# Patient Record
Sex: FEMALE | Race: BLACK OR AFRICAN AMERICAN | Employment: PART TIME | URBAN - METROPOLITAN AREA
[De-identification: names, ages, dates, MRNs, and addresses within clinical notes are randomized per-mention and may not be internally consistent; named-entity substitution may affect disease eponyms.]

---

## 2017-03-27 ENCOUNTER — TELEPHONE (OUTPATIENT)
Dept: OBGYN | Facility: CLINIC | Age: 27
End: 2017-03-27

## 2017-03-27 DIAGNOSIS — Z32.01 POSITIVE PREGNANCY TEST: Primary | ICD-10-CM

## 2017-03-27 NOTE — TELEPHONE ENCOUNTER
Please advise     -congratulations!     -is good that notified early of pregnancy     -recommend quantitative HCG today and in 24 hours     -ultrasound when appropriate     -report any bleeding or lateralizing abdominal pain

## 2017-03-27 NOTE — TELEPHONE ENCOUNTER
Pt calls today reporting that she took a home pregnancy test that was positive on Saturday (two days ago).  She has hx of ectopic pregnancy in 2014 and wonders if she should have an appt early due to this.  LMP 02/22/17.   Do you want early u/s?    Pt advised to report any signs of abd pain or bleeding.       Leonela Keating R.N.

## 2017-03-28 ENCOUNTER — TELEPHONE (OUTPATIENT)
Dept: NURSING | Facility: CLINIC | Age: 27
End: 2017-03-28

## 2017-03-28 DIAGNOSIS — Z32.01 POSITIVE PREGNANCY TEST: ICD-10-CM

## 2017-03-28 LAB — B-HCG SERPL-ACNC: <1 IU/L (ref 0–5)

## 2017-03-28 PROCEDURE — 84702 CHORIONIC GONADOTROPIN TEST: CPT | Performed by: OBSTETRICS & GYNECOLOGY

## 2017-03-28 PROCEDURE — 36415 COLL VENOUS BLD VENIPUNCTURE: CPT | Performed by: OBSTETRICS & GYNECOLOGY

## 2017-03-28 NOTE — TELEPHONE ENCOUNTER
"Call Type: Triage Call    Presenting Problem: \"Calling for lab results.\"  Triage Note:  Guideline Title: Information Only Call; No Symptom Triage (Adult)  Recommended Disposition: Call Provider When Office is Open  Original Inclination: Wanted to speak with a nurse  Override Disposition:  Intended Action: Follow advice given  Physician Contacted: No  Requesting information not available per approved reference or clinical  experience; no triage required. ?  YES  Requesting regular office appointment ? NO  Sign(s) or symptom(s) associated with a diagnosed condition or with a new illness  ? NO  Requesting information about provider, services or community resources ? NO  Call back to complete assessment/clarification of information from prior caller to  complete triage ? NO  Requesting information and provider is best resource; no triage required. ? NO  Requesting provider information for recently scheduled test, procedure; no triage  required. Needed information not available per approved resources or clinical  experience. ? NO  Physician Instructions:  Care Advice:  "

## 2017-03-29 NOTE — TELEPHONE ENCOUNTER
Per epic 03/28 hcg result <1 despite positive home pregnancy test on Sat.     Please advise, thanks.

## 2017-03-29 NOTE — TELEPHONE ENCOUNTER
Please advise     Quantitative HCG has returned negative       -the most likely explanation is very early pregnancy (as reflected by home test)          -that did not progress       -may repeat HCG if desires     Let us know if no menses within 6 weeks

## 2017-07-01 ENCOUNTER — HEALTH MAINTENANCE LETTER (OUTPATIENT)
Age: 27
End: 2017-07-01

## 2017-12-06 ENCOUNTER — OFFICE VISIT (OUTPATIENT)
Dept: OBGYN | Facility: CLINIC | Age: 27
End: 2017-12-06
Payer: COMMERCIAL

## 2017-12-06 ENCOUNTER — RESULT FOLLOW UP (OUTPATIENT)
Dept: OBGYN | Facility: CLINIC | Age: 27
End: 2017-12-06

## 2017-12-06 VITALS
HEART RATE: 84 BPM | SYSTOLIC BLOOD PRESSURE: 118 MMHG | HEIGHT: 70 IN | WEIGHT: 232.5 LBS | DIASTOLIC BLOOD PRESSURE: 86 MMHG | BODY MASS INDEX: 33.28 KG/M2

## 2017-12-06 DIAGNOSIS — R87.610 ATYPICAL SQUAMOUS CELLS OF UNDETERMINED SIGNIFICANCE (ASCUS) ON PAPANICOLAOU SMEAR OF CERVIX: ICD-10-CM

## 2017-12-06 DIAGNOSIS — N97.9 FEMALE INFERTILITY: ICD-10-CM

## 2017-12-06 DIAGNOSIS — R87.610 ASCUS OF CERVIX WITH NEGATIVE HIGH RISK HPV: ICD-10-CM

## 2017-12-06 DIAGNOSIS — Z12.4 SCREENING FOR CERVICAL CANCER: ICD-10-CM

## 2017-12-06 DIAGNOSIS — Z01.419 WOMEN'S ANNUAL ROUTINE GYNECOLOGICAL EXAMINATION: Primary | ICD-10-CM

## 2017-12-06 LAB
FSH SERPL-ACNC: 8.6 IU/L
HBA1C MFR BLD: 5.2 % (ref 4.3–6)
LH SERPL-ACNC: 23.5 IU/L

## 2017-12-06 PROCEDURE — 84403 ASSAY OF TOTAL TESTOSTERONE: CPT | Performed by: ADVANCED PRACTICE MIDWIFE

## 2017-12-06 PROCEDURE — G0124 SCREEN C/V THIN LAYER BY MD: HCPCS | Performed by: ADVANCED PRACTICE MIDWIFE

## 2017-12-06 PROCEDURE — 99395 PREV VISIT EST AGE 18-39: CPT | Performed by: ADVANCED PRACTICE MIDWIFE

## 2017-12-06 PROCEDURE — G0145 SCR C/V CYTO,THINLAYER,RESCR: HCPCS | Performed by: ADVANCED PRACTICE MIDWIFE

## 2017-12-06 PROCEDURE — 87624 HPV HI-RISK TYP POOLED RSLT: CPT | Performed by: ADVANCED PRACTICE MIDWIFE

## 2017-12-06 PROCEDURE — 83002 ASSAY OF GONADOTROPIN (LH): CPT | Performed by: ADVANCED PRACTICE MIDWIFE

## 2017-12-06 PROCEDURE — 83036 HEMOGLOBIN GLYCOSYLATED A1C: CPT | Performed by: ADVANCED PRACTICE MIDWIFE

## 2017-12-06 PROCEDURE — 83001 ASSAY OF GONADOTROPIN (FSH): CPT | Performed by: ADVANCED PRACTICE MIDWIFE

## 2017-12-06 PROCEDURE — 36415 COLL VENOUS BLD VENIPUNCTURE: CPT | Performed by: ADVANCED PRACTICE MIDWIFE

## 2017-12-06 PROCEDURE — 84443 ASSAY THYROID STIM HORMONE: CPT | Performed by: ADVANCED PRACTICE MIDWIFE

## 2017-12-06 NOTE — PROGRESS NOTES
Johanne is a 27 year old  female who presents for annual exam.     Besides routine health maintenance,  she would like to discuss fertility issues.  HPI:  Johanne presents for her annual exam. She is due for her pap. She has been having fertility issues for some time now. She was taking Clomid, without success, but has stopped now. She reports a 60lb weight gain in the last two years. She reports a family history of diabetes and is worried she may have diabetes. She reports foot pain in the arches of her feet, worse upon waking and walking.   Menses are regular q 28-30 days and normal lasting 5 days.   Menses flow: normal.    Patient's last menstrual period was 2017 (exact date)..   Using nothing for contraception.    She is currently considering pregnancy.    REPRODUCTIVE/GYNECOLOGIC HISTORY:  Johanne is sexually active with male partner(s) and is currently in monogamous relationship.   STI testing offered?  Declined  History of abnormal Pap smear:  No  SOCIAL HISTORY  Abuse: does not report having previously been physical or sexually abused.    Do you feel safe in your environment? YES    She  reports that she has never smoked. She has never used smokeless tobacco.        Last PHQ-9 score on record = PHQ-9 SCORE 2008   Total Score 3     Last GAD7 score on record = No flowsheet data found.  Alcohol Score = social    HEALTH MAINTENANCE:  Cholesterol: (No results found for: CHOL History of abnormal lipids: No  Mammo: not indicated . History of abnormal Mammo: No.  Regular Self Breast Exams: Yes  TSH: (  TSH   Date Value Ref Range Status   07/10/2015 2.08 0.40 - 4.00 mU/L Final    )  Pap; (  Lab Results   Component Value Date    PAP ASC-US 2013    PAP ASC-US 2008    PAP NIL 2007    )  Immunizations up to date: yes  (Gardasil, Tdap, Flu)  Health maintenance updated:  yes    HEALTHY HABITS  Eating habits: eats regular meals and follows a balanced nutrition diet  Calcium/Vitamin D intake: source:   dairy Adequate?   Exercise: How often do you exercise? Daily;Walking  Have you had an eye exam in the last two years? YES  Do you routinely see the dentist once or twice yearly? YES      HISTORY:  Obstetric History       T2      L2     SAB0   TAB0   Ectopic1   Multiple0   Live Births2       # Outcome Date GA Lbr Oz/2nd Weight Sex Delivery Anes PTL Lv   3 Ectopic 14     ECTOPIC      2 Term 08 37w0d  7 lb 7 oz (3.374 kg) F    SALAZAR      Name: Shelly   1 Term 07 41w0d  8 lb (3.629 kg) F  EPI  SALAZAR      Name: Federico        Past Medical History:   Diagnosis Date     NO ACTIVE PROBLEMS      Past Surgical History:   Procedure Laterality Date     NO HISTORY OF SURGERY       Family History   Problem Relation Age of Onset     DIABETES Mother      Hypertension Mother      DIABETES Maternal Grandfather      DIABETES Paternal Grandfather      DIABETES Brother      HEART DISEASE Father      Social History     Social History     Marital status:      Spouse name: Antonette     Number of children: 1     Years of education: 12     Occupational History      None      Social History Main Topics     Smoking status: Never Smoker     Smokeless tobacco: Never Used     Alcohol use Yes      Comment: occasional     Drug use: No     Sexual activity: Yes     Partners: Male     Birth control/ protection: , None     Other Topics Concern     None     Social History Narrative       Current Outpatient Prescriptions:      NO ACTIVE MEDICATIONS, , Disp: , Rfl:      clomiPHENE (CLOMID) 50 MG tablet, Two tablets per day; day 5-9 of cycle (Patient not taking: Reported on 2017), Disp: 2 tablet, Rfl: 6   No Known Allergies    Past medical, surgical, social and family history were reviewed and updated in EPIC.    ROS:   C: NEGATIVE for fever, chills, change in weight  I: NEGATIVE for worrisome rashes, moles or lesions  E: NEGATIVE for vision changes or irritation  ENT: NEGATIVE for ear, mouth and throat  "problems  R: NEGATIVE for significant cough or SOB  B: NEGATIVE for masses, tenderness or discharge  CV: NEGATIVE for chest pain, palpitations or peripheral edema  GI: NEGATIVE for nausea, abdominal pain, heartburn, or change in bowel habits  : NEGATIVE for unusual urinary or vaginal symptoms. Periods are regular.  M: NEGATIVE for significant arthralgias or myalgia  N: NEGATIVE for weakness, dizziness or paresthesias  P: NEGATIVE for changes in mood or affect    PHYSICAL EXAM:  /86  Pulse 84  Ht 5' 10\" (1.778 m)  Wt 232 lb 8 oz (105.5 kg)  LMP 11/25/2017 (Exact Date)  Breastfeeding? No  BMI 33.36 kg/m2   BMI: Body mass index is 33.36 kg/(m^2).  Constitutional: healthy, alert and no distress  Neck: symmetrical, thyroid normal size, no masses present, no lymphadenopathy present.   Cardiovascular: RRR, no murmurs present  Respiratory: breathing unlabored, lungs CTA bilaterally  Breast:normal without masses, tenderness or nipple discharge and no palpable axillary masses or adenopathy  Gastrointestinal: abdomen soft, non-tender, bowel sounds present  PELVIC EXAM:  Vulva: No lesions, no adenopathy, BUS WNL  Vagina: Moist, pink, discharge normal  well rugated, no lesions  Cervix:smooth, pink, no visible lesions  Uterus: Normal size, non-tender, mobile  Ovaries: No masses palpated  Rectal exam: deferred    ASSESSMENT/PLAN:    ICD-10-CM    1. Women's annual routine gynecological examination Z01.419    2. Female infertility N97.9 Hemoglobin A1c     TSH with free T4 reflex     Follicle stimulating hormone     Lutropin     Testosterone, total   3. Screening for cervical cancer Z12.4 PAP imaged thin layer screen     Results for orders placed or performed in visit on 03/28/17   HCG, quantitative, pregnancy   Result Value Ref Range    HCG Quantitative Serum <1 0 - 5 IU/L         COUNSELING:   Reviewed preventive health counseling, as reflected in patient instructions   reports that she has never smoked. She has never " used smokeless tobacco.    Pap done today. HA1C, TSH, free T4, FSH, LH, and total testosterone ordered. Discussed continued fertility issues, and that she would be better served by a physician after lab results come back. Discussed seeing podiatry for foot pain.     Bethany Steinberg CNM

## 2017-12-06 NOTE — LETTER
May 22, 2018      Johanne Romero  [address]  [place] [address]    Dear ,      At [facility], your health and wellness is our primary concern. That is why we are following up on an abnormal pap from 12/6/17, which was reported as ASCUS. Your provider had recommended that you have a Colposcopy completed by 3/6/18. Our records do not show that this has been scheduled.     It is important to complete the follow up that your provider has suggested for you to ensure that there are no worsening changes which may, over time, develop into cancer.      If you have chosen not to do the recommended colposcopy, please contact our office at [phone] to schedule an appointment for a repeat PAP smear and HPV test at your earliest convenience.    If you have completed the tests outside of [facility], please have the results forwarded to our office. We will update the chart for your primary Physician to review before your next annual physical.     Thank you for choosing [facility]!    Sincerely,      PELON Newell CNM/victoriano

## 2017-12-06 NOTE — NURSING NOTE
"Chief Complaint   Patient presents with     Physical     Discuss fertility issues       Initial /86  Pulse 84  Ht 5' 10\" (1.778 m)  Wt 232 lb 8 oz (105.5 kg)  LMP 11/25/2017 (Exact Date)  Breastfeeding? No  BMI 33.36 kg/m2 Estimated body mass index is 33.36 kg/(m^2) as calculated from the following:    Height as of this encounter: 5' 10\" (1.778 m).    Weight as of this encounter: 232 lb 8 oz (105.5 kg).  Medication Reconciliation: complete     Discuss fertility issues.    Josefina Hernandez, PAM      "

## 2017-12-06 NOTE — MR AVS SNAPSHOT
After Visit Summary   12/6/2017    Johanne Romero    MRN: [id number]           Patient Information     Date Of Birth          1990        Visit Information        Provider Department      12/6/2017 8:00 AM Bethany Steinberg APRN CNM [facility]        Today's Diagnoses     Women's annual routine gynecological examination    -  1    Female infertility        Screening for cervical cancer           Follow-ups after your visit        Follow-up notes from your care team     Return if symptoms worsen or fail to improve.      Who to contact     If you have questions or need follow up information about today's clinic visit or your schedule please contact [facility] directly at [phone].  Normal or non-critical lab and imaging results will be communicated to you by MyChart, letter or phone within 4 business days after the clinic has received the results. If you do not hear from us within 7 days, please contact the clinic through [employer] or phone. If you have a critical or abnormal lab result, we will notify you by phone as soon as possible.  Submit refill requests through [employer] or call your pharmacy and they will forward the refill request to us. Please allow 3 business days for your refill to be completed.          Additional Information About Your Visit        MyChart Information     [employer] gives you secure access to your electronic health record. If you see a primary care provider, you can also send messages to your care team and make appointments. If you have questions, please call your primary care clinic.  If you do not have a primary care provider, please call [phone] and they will assist you.        Care EveryWhere ID     This is your Care EveryWhere ID. This could be used by other organizations to access your [place] medical records  [id number]        Your Vitals Were     Pulse Height Last Period Breastfeeding? BMI (Body Mass Index)        "84 5' 10\" (1.778 m) 11/25/2017 (Exact Date) No 33.36 kg/m2        Blood Pressure from Last 3 Encounters:   12/06/17 118/86   07/29/16 127/79   07/10/15 116/72    Weight from Last 3 Encounters:   12/06/17 232 lb 8 oz (105.5 kg)   07/10/15 202 lb 14.4 oz (92 kg)   12/09/14 200 lb (90.7 kg)              We Performed the Following     Follicle stimulating hormone     Hemoglobin A1c     Lutropin     PAP imaged thin layer screen     Testosterone, total     TSH with free T4 reflex        Primary Care Provider Office Phone # Fax #    [facility] [phone]       [address] [place] [address]        Equal Access to Services     HAN FISHER : Eliana wildo Soandrew, waaxda luqadaha, qaybta kaalmada adeegyada, louis nava. So [facility] [phone].    ATENCIÓN: Si habla español, tiene a sparks disposición servicios gratuitos de asistencia lingüística. Llame al [phone].    We comply with applicable federal civil rights laws and [place] laws. We do not discriminate on the basis of race, color, national origin, age, disability, sex, sexual orientation, or gender identity.            Thank you!     Thank you for choosing [facility]  for your care. Our goal is always to provide you with excellent care. Hearing back from our patients is one way we can continue to improve our services. Please take a few minutes to complete the written survey that you may receive in the mail after your visit with us. Thank you!             Your Updated Medication List - Protect others around you: Learn how to safely use, store and throw away your medicines at www.disposemymeds.org.          This list is accurate as of: 12/6/17  8:54 AM.  Always use your most recent med list.                   Brand Name Dispense Instructions for use Diagnosis    clomiPHENE 50 MG tablet    CLOMID    2 tablet    Two tablets per day; day 5-9 of cycle    Irregular periods    "    NO ACTIVE MEDICATIONS

## 2017-12-06 NOTE — LETTER
February 12, 2018      Johanne Romero  [address]  [place] [address]    Dear ,      At [facility], your health and wellness is our primary concern. That is why we are following up on an abnormal pap from 12/6/17, which was reported as ASCUS. Your provider had recommended that you have a Colposcopy completed by 3/6/18. Our records do not show that this has been scheduled.    It is important to complete the follow up that your provider has suggested for you to ensure that there are no worsening changes which may, over time, develop into cancer.      Please contact our office at  [phone] to schedule an appointment for a Colposcopy at your earliest convenience. If you have questions or concerns, please call the clinic and we will be happy to assist you.    If you have completed the tests outside of [facility], please have the results forwarded to our office. We will update the chart for your primary Physician to review before your next annual physical.     Thank you for choosing [facility]!    Sincerely,      PELON Newell CNM/victoriano

## 2017-12-06 NOTE — LETTER
February 22, 2018      Johanne Romero  [address]  [place] [address]    Dear ,      At [facility], your health and wellness is our primary concern. That is why we are following up on an abnormal pap from 12/6/17, which was reported as ASCUS. Your provider had recommended that you have a Colposcopy completed by 3/6/18. Our records do not show that this has been scheduled.    It is important to complete the follow up that your provider has suggested for you to ensure that there are no worsening changes which may, over time, develop into cancer.      Please contact our office at  [phone] to schedule an appointment for a Colposcopy at your earliest convenience. If you have questions or concerns, please call the clinic and we will be happy to assist you.    If you have completed the tests outside of [facility], please have the results forwarded to our office. We will update the chart for your primary Physician to review before your next annual physical.     Thank you for choosing [facility]!    Sincerely,      PELON Newell CNM/victoriano

## 2017-12-07 LAB — TSH SERPL DL<=0.005 MIU/L-ACNC: 3.02 MU/L (ref 0.4–4)

## 2017-12-11 LAB
COPATH REPORT: ABNORMAL
PAP: ABNORMAL

## 2017-12-12 LAB — TESTOST SERPL-MCNC: 26 NG/DL (ref 8–60)

## 2017-12-13 LAB
FINAL DIAGNOSIS: NORMAL
HPV HR 12 DNA CVX QL NAA+PROBE: NEGATIVE
HPV16 DNA SPEC QL NAA+PROBE: NEGATIVE
HPV18 DNA SPEC QL NAA+PROBE: NEGATIVE
SPECIMEN DESCRIPTION: NORMAL

## 2017-12-15 NOTE — PROGRESS NOTES
7/3/08 ASCUS, Neg HPV  4/12/13 ASCUS, Neg HPV  12/6/17 ASCUS, Neg HPV. Plan: Colposcopy by 3/6/18  12/18/17 left msg  12/27/17 Patient notified of results/recommendations. (LN)  2/12/18 My Chart Colposcopy Reminder message sent (rlm)  2/22/18 My Chart not read, reminder letter sent (rlm)  3/15/18 3 month Winifrede not done, updated to 6mo Winifrede/Pap due by 6/6/18 (rlm) FYI sent to provider (edgar)  5/22/18 Winifrede/Pap reminder letter sent (rlm)  11/23/18 LMTC and schedule at [facility]. ([facility])  12/07/18 Patient is lost to pap tracking follow-up. FYI routed to provider. ([facility])

## 2018-03-15 PROBLEM — R87.610 ASCUS OF CERVIX WITH NEGATIVE HIGH RISK HPV: Status: ACTIVE | Noted: 2017-12-13

## 2018-06-09 ENCOUNTER — HEALTH MAINTENANCE LETTER (OUTPATIENT)
Age: 28
End: 2018-06-09

## 2018-08-11 ENCOUNTER — HEALTH MAINTENANCE LETTER (OUTPATIENT)
Age: 28
End: 2018-08-11

## 2018-11-01 ENCOUNTER — OFFICE VISIT (OUTPATIENT)
Dept: INTERNAL MEDICINE | Facility: CLINIC | Age: 28
End: 2018-11-01
Payer: COMMERCIAL

## 2018-11-01 VITALS
HEIGHT: 70 IN | WEIGHT: 238 LBS | SYSTOLIC BLOOD PRESSURE: 120 MMHG | DIASTOLIC BLOOD PRESSURE: 78 MMHG | TEMPERATURE: 98 F | HEART RATE: 93 BPM | RESPIRATION RATE: 12 BRPM | OXYGEN SATURATION: 99 % | BODY MASS INDEX: 34.07 KG/M2

## 2018-11-01 DIAGNOSIS — H65.02 ACUTE SEROUS OTITIS MEDIA OF LEFT EAR, RECURRENCE NOT SPECIFIED: Primary | ICD-10-CM

## 2018-11-01 PROCEDURE — 99213 OFFICE O/P EST LOW 20 MIN: CPT | Performed by: INTERNAL MEDICINE

## 2018-11-01 NOTE — PROGRESS NOTES
"  SUBJECTIVE:   Johanne Romero is a 28 year old female who presents to clinic today for the following health issues:      Ear pain      Duration: x3 days    Description (location/character/radiation): Left ear    Intensity:  moderate    Accompanying signs and symptoms: Sneezing, scratchy throat, bright green stools    History (similar episodes/previous evaluation): None    Precipitating or alleviating factors: None    Therapies tried and outcome: None       Problem list and histories reviewed & adjusted, as indicated.  Additional history: as documented    Labs reviewed in EPIC    Reviewed and updated as needed this visit by clinical staff       Reviewed and updated as needed this visit by Provider         ROS:  Constitutional, HEENT, cardiovascular, pulmonary, gi and gu systems are negative, except as otherwise noted.    OBJECTIVE:                                                    /78 (BP Location: Right arm, Patient Position: Chair, Cuff Size: Adult Large)  Pulse 93  Temp 98  F (36.7  C) (Oral)  Resp 12  Ht 5' 10\" (1.778 m)  Wt 238 lb (108 kg)  SpO2 99%  BMI 34.15 kg/m2  Body mass index is 34.15 kg/(m^2).  GENERAL APPEARANCE: healthy, alert and no distress  HENT: L ear : TM bulging though no erythema, fluid noted behind TM.     Diagnostic test results:  none      ASSESSMENT/PLAN:                                                    1. Acute serous otitis media of left ear, recurrence not specified  - decongestant.      Ayush Gongora MD  [facility]  "

## 2018-11-01 NOTE — MR AVS SNAPSHOT
After Visit Summary   11/1/2018    Johanne Romero    MRN: [id number]           Patient Information     Date Of Birth          1990        Visit Information        Provider Department      11/1/2018 11:00 AM Ayush Gongora MD [facility]        Today's Diagnoses     Acute serous otitis media of left ear, recurrence not specified    -  1    Need for prophylactic vaccination and inoculation against influenza          Care Instructions    Can try one of the following over the counter antihistamine-decongestant combinations    Loratadine-pseudoephedrine   Fexofenadine- pseudoephedrine   Cetirizine-pseudoephedrine     Whichever is cheapest               Follow-ups after your visit        Who to contact     If you have questions or need follow up information about today's clinic visit or your schedule please contact [facility] directly at [phone].  Normal or non-critical lab and imaging results will be communicated to you by MyChart, letter or phone within 4 business days after the clinic has received the results. If you do not hear from us within 7 days, please contact the clinic through [employer] or phone. If you have a critical or abnormal lab result, we will notify you by phone as soon as possible.  Submit refill requests through [employer] or call your pharmacy and they will forward the refill request to us. Please allow 3 business days for your refill to be completed.          Additional Information About Your Visit        MyChart Information     [employer] gives you secure access to your electronic health record. If you see a primary care provider, you can also send messages to your care team and make appointments. If you have questions, please call your primary care clinic.  If you do not have a primary care provider, please call [phone] and they will assist you.        Care EveryWhere ID     This is your Care EveryWhere ID. This could be  "used by other organizations to access your [place] medical records  [id number]        Your Vitals Were     Pulse Temperature Respirations Height Pulse Oximetry BMI (Body Mass Index)    93 98  F (36.7  C) (Oral) 12 5' 10\" (1.778 m) 99% 34.15 kg/m2       Blood Pressure from Last 3 Encounters:   11/01/18 120/78   12/06/17 118/86   07/29/16 127/79    Weight from Last 3 Encounters:   11/01/18 238 lb (108 kg)   12/06/17 232 lb 8 oz (105.5 kg)   07/10/15 202 lb 14.4 oz (92 kg)              Today, you had the following     No orders found for display       Primary Care Provider Office Phone # Fax #    [facility] [phone]       [address] [place] [address]        Equal Access to Services     HAN FISHER : Hadii godfrey ku hadasho Soomaali, waaxda luqadaha, qaybta kaalmada adeegyada, louis shayin haykelly jones . So [facility] [phone].    ATENCIÓN: Si habla español, tiene a sparks disposición servicios gratuitos de asistencia lingüística. Llame al [phone].    We comply with applicable federal civil rights laws and [place] laws. We do not discriminate on the basis of race, color, national origin, age, disability, sex, sexual orientation, or gender identity.            Thank you!     Thank you for choosing [facility]  for your care. Our goal is always to provide you with excellent care. Hearing back from our patients is one way we can continue to improve our services. Please take a few minutes to complete the written survey that you may receive in the mail after your visit with us. Thank you!             Your Updated Medication List - Protect others around you: Learn how to safely use, store and throw away your medicines at www.disposemymeds.org.          This list is accurate as of 11/1/18 11:33 AM.  Always use your most recent med list.                   Brand Name Dispense Instructions for use Diagnosis    clomiPHENE 50 MG tablet    " CLOMID    2 tablet    Two tablets per day; day 5-9 of cycle    Irregular periods       NO ACTIVE MEDICATIONS

## 2018-11-01 NOTE — PATIENT INSTRUCTIONS
Can try one of the following over the counter antihistamine-decongestant combinations    Loratadine-pseudoephedrine   Fexofenadine- pseudoephedrine   Cetirizine-pseudoephedrine     Whichever is cheapest

## 2018-11-23 ENCOUNTER — TELEPHONE (OUTPATIENT)
Dept: OBGYN | Facility: CLINIC | Age: 28
End: 2018-11-23

## 2018-11-23 NOTE — TELEPHONE ENCOUNTER
Pt is past due for f/u pap smear if declining recommended colposcopy.  LMTC and schedule at [facility].  sAia Tenorio,    Pap Tracking

## 2019-01-17 ENCOUNTER — TELEPHONE (OUTPATIENT)
Dept: OBGYN | Facility: CLINIC | Age: 29
End: 2019-01-17

## 2019-01-17 NOTE — TELEPHONE ENCOUNTER
Reason for Call:  Other call back    Detailed comments: Pt wants to know what test in OBGYN she was to have.  Phone Number Patient can be reached at: Home number on file [phone] (home)    Best Time: anytime    Can we leave a detailed message on this number? YES    Call taken on 1/17/2019 at 7:27 AM by PADMAJA SOARES

## 2019-01-17 NOTE — TELEPHONE ENCOUNTER
I called patient back to ask which testing she is referring to. In her chart it looks like the patient needs a repeat pap, if declined recommendation is colposcopy. Patient states that she has an appointment next week with Angelica Barron for a repeat pap.    Marcos Gale, CMA

## 2020-02-08 ENCOUNTER — HEALTH MAINTENANCE LETTER (OUTPATIENT)
Age: 30
End: 2020-02-08

## 2020-11-07 ENCOUNTER — HEALTH MAINTENANCE LETTER (OUTPATIENT)
Age: 30
End: 2020-11-07

## 2021-03-21 ENCOUNTER — HEALTH MAINTENANCE LETTER (OUTPATIENT)
Age: 31
End: 2021-03-21

## 2021-09-05 ENCOUNTER — HEALTH MAINTENANCE LETTER (OUTPATIENT)
Age: 31
End: 2021-09-05

## 2022-04-17 ENCOUNTER — HEALTH MAINTENANCE LETTER (OUTPATIENT)
Age: 32
End: 2022-04-17

## 2022-10-23 ENCOUNTER — HEALTH MAINTENANCE LETTER (OUTPATIENT)
Age: 32
End: 2022-10-23

## 2023-06-01 ENCOUNTER — HEALTH MAINTENANCE LETTER (OUTPATIENT)
Age: 33
End: 2023-06-01